# Patient Record
Sex: FEMALE | Race: WHITE | NOT HISPANIC OR LATINO | URBAN - METROPOLITAN AREA
[De-identification: names, ages, dates, MRNs, and addresses within clinical notes are randomized per-mention and may not be internally consistent; named-entity substitution may affect disease eponyms.]

---

## 2018-04-21 ENCOUNTER — EMERGENCY (EMERGENCY)
Facility: HOSPITAL | Age: 23
LOS: 1 days | Discharge: HOME | End: 2018-04-21
Attending: EMERGENCY MEDICINE | Admitting: EMERGENCY MEDICINE
Payer: COMMERCIAL

## 2018-04-21 VITALS
HEART RATE: 96 BPM | SYSTOLIC BLOOD PRESSURE: 121 MMHG | OXYGEN SATURATION: 99 % | RESPIRATION RATE: 18 BRPM | TEMPERATURE: 98 F | DIASTOLIC BLOOD PRESSURE: 82 MMHG

## 2018-04-21 DIAGNOSIS — F10.120 ALCOHOL ABUSE WITH INTOXICATION, UNCOMPLICATED: ICD-10-CM

## 2018-04-21 DIAGNOSIS — R41.82 ALTERED MENTAL STATUS, UNSPECIFIED: ICD-10-CM

## 2018-04-21 LAB — ETHANOL SERPL-MCNC: 240 MG/DL — HIGH

## 2018-04-21 PROCEDURE — 99284 EMERGENCY DEPT VISIT MOD MDM: CPT | Mod: 25

## 2018-04-21 RX ORDER — SODIUM CHLORIDE 9 MG/ML
1000 INJECTION INTRAMUSCULAR; INTRAVENOUS; SUBCUTANEOUS ONCE
Qty: 0 | Refills: 0 | Status: COMPLETED | OUTPATIENT
Start: 2018-04-21 | End: 2018-04-21

## 2018-04-21 RX ORDER — ONDANSETRON 8 MG/1
4 TABLET, FILM COATED ORAL ONCE
Qty: 0 | Refills: 0 | Status: COMPLETED | OUTPATIENT
Start: 2018-04-21 | End: 2018-04-21

## 2018-04-21 RX ADMIN — ONDANSETRON 4 MILLIGRAM(S): 8 TABLET, FILM COATED ORAL at 22:42

## 2018-04-21 RX ADMIN — SODIUM CHLORIDE 1000 MILLILITER(S): 9 INJECTION INTRAMUSCULAR; INTRAVENOUS; SUBCUTANEOUS at 22:43

## 2018-04-21 NOTE — ED ADULT NURSE NOTE - OBJECTIVE STATEMENT
BIBA from [facility] etoh intox and vomiting, brother at bedside. No associated injury, no s/s trauma. Pt placed in stretcher, IVF and meds given, fall precautions will monitor and f/u as indicated

## 2018-04-22 NOTE — ED PROVIDER NOTE - MEDICAL DECISION MAKING DETAILS
Alcohol intoxication, no signs of trauma, not hypoglycemic, neuro exam non-focal, will observe and reassess frequently.  Will check alcohol level and give IV hydration and anti-emetic.  Brother stayed with pt the entire time until her parents came from  to pick them up.

## 2018-04-22 NOTE — ED PROVIDER NOTE - OBJECTIVE STATEMENT
Pt BIB by EMS and accompanied by her brother for ETOH intoxication. Pt BIB by EMS and accompanied by her brother for ETOH intoxication.  Her brother is able to give a full hx and reports they are visiting from [place].  he reports she drank too much alcohol tonight.  He was with her the entire time and denies any falls, altercations, or injuries.  She started having N/V and she was unable to walk so called EMS.  Denies any drug use.

## 2021-11-04 NOTE — ED ADULT NURSE NOTE - DOES PATIENT HAVE ADVANCE DIRECTIVE
[facility] sent Rx request for the following:      Requested Prescriptions   Pending Prescriptions Disp Refills     amLODIPine (NORVASC) 5 MG tablet [Pharmacy Med Name: AMLODIPINE BESYLATE 5MG TABLETS] 90 tablet      Sig: TAKE 1 TABLET(5 MG) BY MOUTH DAILY FOR HIGH BLOOD PRESSURE. STOP AVELIDE CAUSED RASH       Calcium Channel Blockers Protocol  Passed - 11/4/2021  8:10 AM        Last Prescription Date:   11/4/2021  Last Fill Qty/Refills:         30, R-1    Last Office Visit:              10/12/2021 (Tarik)   Future Office visit:           11/10/2021 (Tarik)  Medication is being denied due to: Redundant refill request refused: Too soon:    Marjorie Bundy RN ....................  11/4/2021   2:48 PM            No

## 2025-06-16 NOTE — ED ADULT NURSE NOTE - CAS EDN DISCHARGE INTERVENTIONS
Conjuntivae and eyelids appear normal , Sclerae : White without injection
IV discontinued, cath removed intact